# Patient Record
(demographics unavailable — no encounter records)

---

## 2025-03-13 NOTE — ASSESSMENT
[FreeTextEntry1] : 98 yo female, hx of diverticulosis, last colonoscopy 2008, with weight loss and constipation. Using smooth move tea and dulcolax. No help with Miralax. Using protonix for gerd. No dysphagia. Has lost 60 lbsover the years. Lives alone.  IMP 1. chronic constipation 2. GERD 3. weight loss 4. T2DM  PLAN 1. samples linzess 72 mg daily 2. Maintain pantoprazole 3. RTO 2months

## 2025-03-13 NOTE — HISTORY OF PRESENT ILLNESS
[FreeTextEntry1] : 96 yo female, hx of diverticulosis, last colonoscopy 2008, with weight loss and constipation. Using smooth move tea and dulcolax. No help with Miralax. Using protonix for gerd. No dysphagia. Has lost 60 lbsover the years. Lives alone.  Pt states had normal colonoscopy prepandemic at [facility]

## 2025-03-13 NOTE — REVIEW OF SYSTEMS
[Recent Weight Loss (___ Lbs)] : recent [unfilled] ~Ulb weight loss [Constipation] : constipation [Heartburn] : heartburn [Negative] : Heme/Lymph [Recent Weight Gain (___ Lbs)] : no recent weight gain